# Patient Record
Sex: FEMALE | URBAN - METROPOLITAN AREA
[De-identification: names, ages, dates, MRNs, and addresses within clinical notes are randomized per-mention and may not be internally consistent; named-entity substitution may affect disease eponyms.]

---

## 2023-10-31 ENCOUNTER — OFFICE (OUTPATIENT)
Dept: URBAN - METROPOLITAN AREA CLINIC 6 | Facility: CLINIC | Age: 58
Setting detail: OPHTHALMOLOGY
End: 2023-10-31
Payer: COMMERCIAL

## 2023-10-31 DIAGNOSIS — H01.002: ICD-10-CM

## 2023-10-31 DIAGNOSIS — H01.005: ICD-10-CM

## 2023-10-31 DIAGNOSIS — H52.4: ICD-10-CM

## 2023-10-31 DIAGNOSIS — H01.001: ICD-10-CM

## 2023-10-31 DIAGNOSIS — H25.13: ICD-10-CM

## 2023-10-31 DIAGNOSIS — H01.004: ICD-10-CM

## 2023-10-31 PROCEDURE — 92015 DETERMINE REFRACTIVE STATE: CPT | Performed by: OPHTHALMOLOGY

## 2023-10-31 PROCEDURE — 92004 COMPRE OPH EXAM NEW PT 1/>: CPT | Performed by: OPHTHALMOLOGY

## 2023-10-31 ASSESSMENT — REFRACTION_MANIFEST
OS_SPHERE: +1.75
OS_ADD: +2.00
OD_ADD: +2.00
OD_VA1: 20/20-2
OS_VA1: 20/20
OD_CYLINDER: -0.50
OS_CYLINDER: -0.50
OD_SPHERE: +1.75
OD_AXIS: 075
OS_AXIS: 170

## 2023-10-31 ASSESSMENT — TONOMETRY
OS_IOP_MMHG: 21
OD_IOP_MMHG: 21

## 2023-10-31 ASSESSMENT — SPHEQUIV_DERIVED
OD_SPHEQUIV: 1.5
OS_SPHEQUIV: 1.5
OD_SPHEQUIV: 1.5
OS_SPHEQUIV: 1.5

## 2023-10-31 ASSESSMENT — VISUAL ACUITY
OD_BCVA: 20/30-2
OS_BCVA: 20/30

## 2023-10-31 ASSESSMENT — REFRACTION_AUTOREFRACTION
OD_SPHERE: +2.00
OS_CYLINDER: -0.50
OD_CYLINDER: -1.00
OS_AXIS: 170
OD_AXIS: 073
OS_SPHERE: +1.75

## 2023-10-31 ASSESSMENT — CONFRONTATIONAL VISUAL FIELD TEST (CVF)
OD_FINDINGS: FULL
OS_FINDINGS: FULL

## 2023-10-31 ASSESSMENT — LID EXAM ASSESSMENTS
OD_BLEPHARITIS: RLL RUL
OS_BLEPHARITIS: LLL LUL

## 2024-08-30 PROBLEM — Z00.00 ENCOUNTER FOR PREVENTIVE HEALTH EXAMINATION: Status: ACTIVE | Noted: 2024-08-30

## 2024-09-09 PROBLEM — I10 HYPERTENSION: Status: ACTIVE | Noted: 2024-09-09

## 2024-09-09 RX ORDER — TIRZEPATIDE 5 MG/.5ML
5 INJECTION, SOLUTION SUBCUTANEOUS
Refills: 0 | Status: ACTIVE | COMMUNITY

## 2024-09-09 RX ORDER — VALSARTAN 160 MG/1
160 TABLET, COATED ORAL
Refills: 0 | Status: ACTIVE | COMMUNITY

## 2024-09-09 RX ORDER — AMLODIPINE BESYLATE 10 MG/1
10 TABLET ORAL
Refills: 0 | Status: ACTIVE | COMMUNITY

## 2024-09-09 RX ORDER — HYDROCHLOROTHIAZIDE 25 MG/1
25 TABLET ORAL
Refills: 0 | Status: ACTIVE | COMMUNITY

## 2024-09-09 RX ORDER — ALPRAZOLAM 0.5 MG/1
0.5 TABLET ORAL
Refills: 0 | Status: ACTIVE | COMMUNITY

## 2024-09-10 ENCOUNTER — APPOINTMENT (OUTPATIENT)
Dept: THORACIC SURGERY | Facility: CLINIC | Age: 59
End: 2024-09-10
Payer: COMMERCIAL

## 2024-09-10 VITALS
SYSTOLIC BLOOD PRESSURE: 123 MMHG | WEIGHT: 175 LBS | BODY MASS INDEX: 29.88 KG/M2 | HEIGHT: 64 IN | OXYGEN SATURATION: 100 % | HEART RATE: 80 BPM | DIASTOLIC BLOOD PRESSURE: 75 MMHG | TEMPERATURE: 97.4 F

## 2024-09-10 DIAGNOSIS — I10 ESSENTIAL (PRIMARY) HYPERTENSION: ICD-10-CM

## 2024-09-10 PROCEDURE — 99204 OFFICE O/P NEW MOD 45 MIN: CPT

## 2024-09-10 NOTE — HISTORY OF PRESENT ILLNESS
[FreeTextEntry1] : Ms. ROSELIA ALCALA is a 59-year-old female, referred by Dr. Gaffney, who presents for consultation. Past medical history includes hypertension.  8/26/24 X-RAY right clavicle and shoulder from [facility] - Mild acromioclavicular degenerative changes

## 2024-09-10 NOTE — ASSESSMENT
[FreeTextEntry1] : Pretty is a 59 year old female with a recent dislocation of the right SC joint.  She is improving clinically and no intervention is needed form a thoracic standpoint.  Thank you for allowing me to participate in the care of your patient.  45 minutes was spent during this encounter.  Rafael Lewis MD Department of Cardiovascular and Thoracic Surgery  [employer] at [facility]

## 2024-09-10 NOTE — HISTORY OF PRESENT ILLNESS
[FreeTextEntry1] : Ms. ROSELIA ALCALA is a 59-year-old female, referred by Dr. Gaffney, who presents for consultation. Past medical history includes hypertension.  8/26/24 X-RAY right clavicle and shoulder from [facility] - Mild acromioclavicular degenerative changes

## 2024-09-10 NOTE — ASSESSMENT
[FreeTextEntry1] : Pretty is a 59 year old female with a recent dislocation of the right SC joint.  She is improving clinically and no intervention is needed form a thoracic standpoint.  Thank you for allowing me to participate in the care of your patient.  45 minutes was spent during this encounter.  Rafael Lewis MD Department of Cardiovascular and Thoracic Surgery  [employer] at [facility]